# Patient Record
Sex: MALE | HISPANIC OR LATINO | URBAN - METROPOLITAN AREA
[De-identification: names, ages, dates, MRNs, and addresses within clinical notes are randomized per-mention and may not be internally consistent; named-entity substitution may affect disease eponyms.]

---

## 2020-06-17 ENCOUNTER — APPOINTMENT (RX ONLY)
Dept: URBAN - METROPOLITAN AREA CLINIC 146 | Facility: CLINIC | Age: 50
Setting detail: DERMATOLOGY
End: 2020-06-17

## 2020-06-17 VITALS — TEMPERATURE: 97.52 F

## 2020-06-17 DIAGNOSIS — H02.40 UNSPECIFIED PTOSIS OF EYELID: ICD-10-CM

## 2020-06-17 PROBLEM — H02.403 UNSPECIFIED PTOSIS OF BILATERAL EYELIDS: Status: ACTIVE | Noted: 2020-06-17

## 2020-06-17 PROCEDURE — ? PATIENT SPECIFIC COUNSELING

## 2020-06-17 PROCEDURE — ? ADDITIONAL NOTES

## 2020-06-17 PROCEDURE — 99202 OFFICE O/P NEW SF 15 MIN: CPT

## 2020-06-17 ASSESSMENT — LOCATION DETAILED DESCRIPTION DERM
LOCATION DETAILED: RIGHT SUPERIOR CENTRAL MALAR CHEEK
LOCATION DETAILED: LEFT SUPERIOR CENTRAL MALAR CHEEK

## 2020-06-17 ASSESSMENT — LOCATION SIMPLE DESCRIPTION DERM
LOCATION SIMPLE: RIGHT CHEEK
LOCATION SIMPLE: LEFT CHEEK

## 2020-06-17 ASSESSMENT — LOCATION ZONE DERM: LOCATION ZONE: FACE

## 2020-06-17 NOTE — PROCEDURE: ADDITIONAL NOTES
Additional Notes: Patient advised to see ophthalmologist, patient instructed to follow up with Dr. Esquivel. Contact information given to patient Additional Notes: Patient advised to see ophthalmologist, patient instructed to follow up with Dr. Eqsuivel. Contact information given to patient

## 2022-01-27 ENCOUNTER — PREPPED CHART (OUTPATIENT)
Dept: URBAN - METROPOLITAN AREA CLINIC 23 | Facility: CLINIC | Age: 52
End: 2022-01-27

## 2022-01-27 NOTE — PATIENT DISCUSSION
h/o muscle alignment surgery as infant; patient aware of OD dominance with fixation and clarity. Monitor.

## 2022-01-27 NOTE — PATIENT DISCUSSION
Medically necessary CLs indicated for best visual function and quality of acuity for working/driving. Keep spectacle correction as backup modality for when cannot wear CL. Trial lenses ordered in custom parameters DTP ship, then CL[facility] with Dr. Lucius Collier for evaluation.

## 2022-01-27 NOTE — PATIENT DISCUSSION
Medically necessary CLs indicated for best visual function and quality of acuity for working/driving. Keep spectacle correction as backup modality for when cannot wear CL. Trial lenses ordered in custom parameters DTP ship, then CL[facility] with Dr. Margaux James for evaluation.

## 2022-01-27 NOTE — PATIENT DISCUSSION
Medically necessary CLs indicated for best visual function and quality of acuity for working/driving. Keep spectacle correction as backup modality for when cannot wear CL. Trial lenses ordered in custom parameters DTP ship, then CL[facility] with Dr. Telly Murphy for evaluation.

## 2022-03-10 ASSESSMENT — TONOMETRY
OS_IOP_MMHG: 8
OD_IOP_MMHG: 10

## 2022-07-14 ENCOUNTER — CONTACT LENSES/GLASSES VISIT (OUTPATIENT)
Dept: URBAN - METROPOLITAN AREA CLINIC 23 | Facility: CLINIC | Age: 52
End: 2022-07-14

## 2022-07-14 DIAGNOSIS — H18.623: ICD-10-CM

## 2022-07-14 DIAGNOSIS — H52.213: ICD-10-CM

## 2022-07-14 DIAGNOSIS — H18.713: ICD-10-CM

## 2022-07-14 PROCEDURE — 92310N CONTACT LENS F/U, 3 OR LESS N/C

## 2022-07-14 ASSESSMENT — VISUAL ACUITY
OU_CC: 20/80
OD_CC: 20/30-
OU_CC: 20/30
OS_CC: 20/40+

## 2022-07-14 NOTE — PATIENT DISCUSSION
RGP lenses dispensed in office. Will order second pair with minor modifications to OS fitting parameters and OD power. DTP ship with CL progress check with Dr. Aaron Antonio after trial period.

## 2022-07-14 NOTE — PATIENT DISCUSSION
RGP lenses dispensed in office. Will order second pair with minor modifications to OS fitting parameters and OD power. DTP ship with CL progress check with Dr. Lavelle Benitez after trial period.

## 2022-07-14 NOTE — PATIENT DISCUSSION
Patient is aware that he will need magnifying RRx for near work including small print and computer use.

## 2022-07-14 NOTE — PATIENT DISCUSSION
RGP lenses dispensed in office. Will order second pair with minor modifications to OS fitting parameters and OD power. DTP ship with CL progress check with Dr. Leanne Miranda after trial period.

## 2022-09-08 ENCOUNTER — CONTACT LENSES/GLASSES VISIT (OUTPATIENT)
Dept: URBAN - METROPOLITAN AREA CLINIC 24 | Facility: CLINIC | Age: 52
End: 2022-09-08

## 2022-09-08 DIAGNOSIS — H18.713: ICD-10-CM

## 2022-09-08 DIAGNOSIS — H52.213: ICD-10-CM

## 2022-09-08 DIAGNOSIS — H18.623: ICD-10-CM

## 2022-09-08 PROCEDURE — 92310N CONTACT LENS F/U, 3 OR LESS N/C

## 2022-09-08 ASSESSMENT — VISUAL ACUITY
OU_CC: 20/30+2
OS_CC: 20/40+2
OD_CC: 20/30-

## 2022-09-08 NOTE — PATIENT DISCUSSION
#1 returned. Patient will keep #2 for now. Order last and final pair under warranty with monovision power changes; DTP ship and then CL progress check with Dr. Geovanna Rooney.

## 2022-09-08 NOTE — PATIENT DISCUSSION
Order last and final pair under warranty with monovision power changes; DTP ship and then CL progress check with Dr. Faye Jimenez.

## 2022-09-08 NOTE — PATIENT DISCUSSION
Order last and final pair under warranty with monovision power changes; DTP ship and then CL progress check with Dr. Josue Rahman.

## 2022-10-20 ENCOUNTER — CONTACT LENSES/GLASSES VISIT (OUTPATIENT)
Dept: URBAN - METROPOLITAN AREA CLINIC 24 | Facility: CLINIC | Age: 52
End: 2022-10-20

## 2022-10-20 DIAGNOSIS — H52.4: ICD-10-CM

## 2022-10-20 DIAGNOSIS — H18.623: ICD-10-CM

## 2022-10-20 DIAGNOSIS — H18.713: ICD-10-CM

## 2022-10-20 DIAGNOSIS — H52.213: ICD-10-CM

## 2022-10-20 PROCEDURE — 92310N CONTACT LENS F/U, 3 OR LESS N/C

## 2022-10-20 NOTE — PATIENT DISCUSSION
Two spectacle Rx's issued to patient for tandem CL/glasses telescopic system. One Rx for multifocal (distance and near enhancement), and one Rx for mid-range (computer workstation).

## 2023-06-05 ENCOUNTER — ESTABLISHED PATIENT (OUTPATIENT)
Dept: URBAN - METROPOLITAN AREA CLINIC 24 | Facility: CLINIC | Age: 53
End: 2023-06-05

## 2023-06-05 DIAGNOSIS — H52.213: ICD-10-CM

## 2023-06-05 DIAGNOSIS — H52.4: ICD-10-CM

## 2023-06-05 DIAGNOSIS — H18.623: ICD-10-CM

## 2023-06-05 DIAGNOSIS — H18.713: ICD-10-CM

## 2023-06-05 PROCEDURE — 92310-2E ESTABLISHED CL PATIENT TORIC (ASTIGMATISM) SINGLE VISION SOFT LENS EVALUATION

## 2023-06-05 PROCEDURE — 92014 COMPRE OPH EXAM EST PT 1/>: CPT

## 2023-06-05 ASSESSMENT — KERATOMETRY
OS_K1POWER_DIOPTERS: 45.00
OD_AXISANGLE_DEGREES: 99
OS_AXISANGLE_DEGREES: 76
OS_AXISANGLE2_DEGREES: 166
OD_K2POWER_DIOPTERS: 41.75
OS_K2POWER_DIOPTERS: 40.75
OD_K1POWER_DIOPTERS: 44.00
OD_AXISANGLE2_DEGREES: 9

## 2023-06-05 ASSESSMENT — VISUAL ACUITY
OD_CC: 20/20-2
OS_CC: 20/20-2
OU_CC: 20/20-2

## 2023-06-05 ASSESSMENT — TONOMETRY
OS_IOP_MMHG: 12
OD_IOP_MMHG: 12